# Patient Record
(demographics unavailable — no encounter records)

---

## 2025-07-30 NOTE — PLAN
[FreeTextEntry1] : Continue all medications as prescribed. Check labs as above. Will adjust any medications based upon lab results.  Reviewed age-appropriate preventive screening tests with patient.  Discussed clean eating (eg Mediterranean style eating plan) and regular exercise/staying as physically active as possible.  Include balance exercises and strength training and core strengthening exercises for bone health and to decrease risk for falls.  Reviewed importance of good self care (e.g. meditation, yoga, adequate rest, regular exercise, magnesium, clean eating, etc.).  Follow up for next physical in one year.

## 2025-07-30 NOTE — ASSESSMENT
[Vaccines Reviewed] : Immunizations reviewed today. Please see immunization details in the vaccine log within the immunization flowsheet.  [FreeTextEntry1] : SYD KLINE is a 33 year old male here for a physical exam.  He has a history of hypertriglyceridemia and thalassemia trait.  He has lost about 50 pounds in the past year through  and exercise. He now feels it is hard for him to keep weight on. He would be more comfortable around 220. He admits that he is not doing any strength training.   He has been feeling more anxiety recently. He admits that this may be part of the reason for his weight loss since he eats less when he is anxious. We discussed trying an SSRI to treat his anxiety since he would not be bothered by weight gain. He will think about this but would prefer to try non-medication options first.  He does admit to feeling nervous about public speaking and would like to try Propranolol. We discussed that he could use this PRN for generalized anxiety as well, though if he has anxiety on a regular basis an SSRI would be a better choice.  His EKG is within normal limits.

## 2025-07-30 NOTE — HEALTH RISK ASSESSMENT
[0] : 2) Feeling down, depressed, or hopeless: Not at all (0) [PHQ-2 Negative - No further assessment needed] : PHQ-2 Negative - No further assessment needed [Never] : Never [[id number]] : 0

## 2025-07-30 NOTE — HISTORY OF PRESENT ILLNESS
[FreeTextEntry1] : SYD KLINE is a 33 year old male here for a physical exam.  [de-identified] : His last physical exam was last year  Vaccines: Tetanus is up to date, 7/18/2018  His last dentist visit was less than one year ago His last eye doctor appointment was never His last dermatologist visit was less than one year ago, Dr. Roberts  His diet is healthy overall Exercise: jogging, walking atrial fibrillation

## 2025-07-30 NOTE — HEALTH RISK ASSESSMENT
[0] : 2) Feeling down, depressed, or hopeless: Not at all (0) [PHQ-2 Negative - No further assessment needed] : PHQ-2 Negative - No further assessment needed [Never] : Never [[id number]] : 0